# Patient Record
Sex: FEMALE | HISPANIC OR LATINO | URBAN - METROPOLITAN AREA
[De-identification: names, ages, dates, MRNs, and addresses within clinical notes are randomized per-mention and may not be internally consistent; named-entity substitution may affect disease eponyms.]

---

## 2020-12-16 ENCOUNTER — APPOINTMENT (RX ONLY)
Dept: URBAN - METROPOLITAN AREA CLINIC 120 | Facility: CLINIC | Age: 40
Setting detail: DERMATOLOGY
End: 2020-12-16

## 2020-12-16 DIAGNOSIS — M67.4 GANGLION: ICD-10-CM

## 2020-12-16 DIAGNOSIS — L81.4 OTHER MELANIN HYPERPIGMENTATION: ICD-10-CM

## 2020-12-16 DIAGNOSIS — T07XXXA INSECT BITE, NONVENOMOUS, OF OTHER, MULTIPLE, AND UNSPECIFIED SITES, WITHOUT MENTION OF INFECTION: ICD-10-CM

## 2020-12-16 PROBLEM — M67.442 GANGLION, LEFT HAND: Status: ACTIVE | Noted: 2020-12-16

## 2020-12-16 PROBLEM — S90.862A INSECT BITE (NONVENOMOUS), LEFT FOOT, INITIAL ENCOUNTER: Status: ACTIVE | Noted: 2020-12-16

## 2020-12-16 PROBLEM — D23.72 OTHER BENIGN NEOPLASM OF SKIN OF LEFT LOWER LIMB, INCLUDING HIP: Status: ACTIVE | Noted: 2020-12-16

## 2020-12-16 PROBLEM — D23.71 OTHER BENIGN NEOPLASM OF SKIN OF RIGHT LOWER LIMB, INCLUDING HIP: Status: ACTIVE | Noted: 2020-12-16

## 2020-12-16 PROCEDURE — 99202 OFFICE O/P NEW SF 15 MIN: CPT

## 2020-12-16 PROCEDURE — ? PRESCRIPTION

## 2020-12-16 PROCEDURE — ? COUNSELING

## 2020-12-16 PROCEDURE — ? ADDITIONAL NOTES

## 2020-12-16 RX ORDER — TRIAMCINOLONE ACETONIDE 1 MG/G
CREAM TOPICAL BID
Qty: 1 | Refills: 1 | Status: ERX | COMMUNITY
Start: 2020-12-16

## 2020-12-16 RX ORDER — MUPIROCIN 20 MG/G
OINTMENT TOPICAL
Qty: 1 | Refills: 1 | Status: ERX | COMMUNITY
Start: 2020-12-16

## 2020-12-16 RX ADMIN — TRIAMCINOLONE ACETONIDE: 1 CREAM TOPICAL at 00:00

## 2020-12-16 RX ADMIN — MUPIROCIN: 20 OINTMENT TOPICAL at 00:00

## 2020-12-16 ASSESSMENT — LOCATION SIMPLE DESCRIPTION DERM
LOCATION SIMPLE: LEFT ANKLE
LOCATION SIMPLE: LEFT FOOT
LOCATION SIMPLE: LEFT HAND

## 2020-12-16 ASSESSMENT — LOCATION ZONE DERM
LOCATION ZONE: FEET
LOCATION ZONE: LEG
LOCATION ZONE: HAND

## 2020-12-16 ASSESSMENT — LOCATION DETAILED DESCRIPTION DERM
LOCATION DETAILED: LEFT POSTERIOR ANKLE
LOCATION DETAILED: LEFT MEDIAL DORSAL FOOT
LOCATION DETAILED: LEFT RADIAL DORSAL HAND

## 2020-12-16 NOTE — PROCEDURE: ADDITIONAL NOTES
Additional Notes: Referred to hand surgeon for evaluation and possible excision due to being symptomatic.
Detail Level: Simple

## 2020-12-16 NOTE — HPI: SKIN LESIONS
How Severe Is Your Skin Lesion?: moderate
Have Your Skin Lesions Been Treated?: not been treated
Is This A New Presentation, Or A Follow-Up?: Skin Lesions
Additional History: Patient applied Otc Cortisone to foot with no improvement